# Patient Record
Sex: FEMALE | Race: WHITE | NOT HISPANIC OR LATINO | URBAN - METROPOLITAN AREA
[De-identification: names, ages, dates, MRNs, and addresses within clinical notes are randomized per-mention and may not be internally consistent; named-entity substitution may affect disease eponyms.]

---

## 2024-04-03 ENCOUNTER — LAB (OUTPATIENT)
Dept: URBAN - METROPOLITAN AREA CLINIC 111 | Facility: CLINIC | Age: 24
End: 2024-04-03

## 2024-04-03 ENCOUNTER — OV NP (OUTPATIENT)
Dept: URBAN - METROPOLITAN AREA CLINIC 111 | Facility: CLINIC | Age: 24
End: 2024-04-03
Payer: COMMERCIAL

## 2024-04-03 VITALS
DIASTOLIC BLOOD PRESSURE: 84 MMHG | SYSTOLIC BLOOD PRESSURE: 133 MMHG | WEIGHT: 181 LBS | BODY MASS INDEX: 32.07 KG/M2 | TEMPERATURE: 99.7 F | HEART RATE: 76 BPM | HEIGHT: 63 IN

## 2024-04-03 DIAGNOSIS — K92.1 HEMATOCHEZIA: ICD-10-CM

## 2024-04-03 PROCEDURE — 99204 OFFICE O/P NEW MOD 45 MIN: CPT | Performed by: INTERNAL MEDICINE

## 2024-04-03 NOTE — HPI-TODAY'S VISIT:
23-year-old female presents with a chief complaint of intermittent rectal bleeding, occurring a couple of times per year without any identified trigger. The bleeding is consistently noted after bowel movements. She denies experiencing associated symptoms such as diarrhea, constipation, or significant pain or cramping. However, she mentions that sometimes the bowel movement is painful when the bleeding occurs, though this is not always the case. The patient reports a family history of cancer, including a sister diagnosed with lymphoma and a maternal great-aunt who had colon cancer. Despite the rectal bleeding, the patient has not observed any changes in bowel habits or frequency.

## 2024-06-06 ENCOUNTER — OFFICE VISIT (OUTPATIENT)
Dept: URBAN - METROPOLITAN AREA SURGERY CENTER 15 | Facility: SURGERY CENTER | Age: 24
End: 2024-06-06

## 2024-06-17 ENCOUNTER — OUT OF OFFICE VISIT (OUTPATIENT)
Dept: URBAN - METROPOLITAN AREA SURGERY CENTER 15 | Facility: SURGERY CENTER | Age: 24
End: 2024-06-17
Payer: COMMERCIAL

## 2024-06-17 ENCOUNTER — OFFICE VISIT (OUTPATIENT)
Dept: URBAN - METROPOLITAN AREA SURGERY CENTER 15 | Facility: SURGERY CENTER | Age: 24
End: 2024-06-17

## 2024-06-17 DIAGNOSIS — K64.8 OTHER HEMORRHOIDS: ICD-10-CM

## 2024-06-17 DIAGNOSIS — Q43.8 TORTUOUS COLON: ICD-10-CM

## 2024-06-17 DIAGNOSIS — K63.5 COLON POLYP: ICD-10-CM

## 2024-06-17 DIAGNOSIS — K62.5 RECTAL BLEEDING: ICD-10-CM

## 2024-06-17 PROCEDURE — 00811 ANES LWR INTST NDSC NOS: CPT | Performed by: NURSE ANESTHETIST, CERTIFIED REGISTERED
